# Patient Record
Sex: MALE | Race: WHITE | NOT HISPANIC OR LATINO | Employment: OTHER | URBAN - METROPOLITAN AREA
[De-identification: names, ages, dates, MRNs, and addresses within clinical notes are randomized per-mention and may not be internally consistent; named-entity substitution may affect disease eponyms.]

---

## 2020-09-02 ENCOUNTER — IMPORTED ENCOUNTER (OUTPATIENT)
Dept: URBAN - METROPOLITAN AREA CLINIC 9 | Facility: CLINIC | Age: 85
End: 2020-09-02

## 2021-09-08 ENCOUNTER — IMPORTED ENCOUNTER (OUTPATIENT)
Dept: URBAN - METROPOLITAN AREA CLINIC 9 | Facility: CLINIC | Age: 86
End: 2021-09-08

## 2021-10-16 ASSESSMENT — KERATOMETRY
OD_AXISANGLE_DEGREES: 108
OS_K1POWER_DIOPTERS: 44
OD_K1POWER_DIOPTERS: 43.75
OS_AXISANGLE_DEGREES: 158
OS_AXISANGLE_DEGREES: 165
OD_K2POWER_DIOPTERS: 44
OS_K1POWER_DIOPTERS: 43.5
OD_AXISANGLE2_DEGREES: 18
OS_K2POWER_DIOPTERS: 44.5
OD_K2POWER_DIOPTERS: 44.25
OD_AXISANGLE_DEGREES: 108
OS_AXISANGLE2_DEGREES: 75
OS_K2POWER_DIOPTERS: 43.75
OD_K1POWER_DIOPTERS: 43.5
OS_AXISANGLE2_DEGREES: 68
OD_AXISANGLE2_DEGREES: 18

## 2021-10-16 ASSESSMENT — VISUAL ACUITY
OS_CC: 20/40 -2 SN
OD_CC: 20/40 SN
OD_CC: 20/30 + SN
OS_CC: 20/50 SN
OS_CC: 20/40 - SN
OD_CC: 20/40 - SN
OS_CC: 20/40 + SN
OD_CC: 20/25 -2 SN
OS_CC: 20/40 + SN

## 2021-10-16 ASSESSMENT — TONOMETRY
OD_IOP_MMHG: 18
OD_IOP_MMHG: 18
OS_IOP_MMHG: 18
OS_IOP_MMHG: 17

## 2021-10-26 ENCOUNTER — PRE-OP/H&P (OUTPATIENT)
Dept: URBAN - METROPOLITAN AREA CLINIC 4 | Facility: CLINIC | Age: 86
End: 2021-10-26

## 2021-10-26 VITALS — HEART RATE: 82 BPM | DIASTOLIC BLOOD PRESSURE: 73 MMHG | HEIGHT: 60 IN | SYSTOLIC BLOOD PRESSURE: 143 MMHG

## 2021-10-26 DIAGNOSIS — H25.13: ICD-10-CM

## 2021-10-26 PROCEDURE — 92136 OPHTHALMIC BIOMETRY: CPT

## 2021-10-26 PROCEDURE — 99211PRE PRE OP VISIT

## 2021-11-03 ENCOUNTER — CATARACT POST-OP 1-DAY (OUTPATIENT)
Dept: URBAN - METROPOLITAN AREA CLINIC 4 | Facility: CLINIC | Age: 86
End: 2021-11-03

## 2021-11-03 DIAGNOSIS — Z96.1: ICD-10-CM

## 2021-11-03 PROCEDURE — 99024 POSTOP FOLLOW-UP VISIT: CPT

## 2021-11-03 ASSESSMENT — TONOMETRY: OS_IOP_MMHG: 23

## 2021-11-03 ASSESSMENT — VISUAL ACUITY
OU_SC: 20/60+2
OS_SC: 20/80-2

## 2021-11-18 ENCOUNTER — POST OP/EVAL OF SECOND EYE (OUTPATIENT)
Dept: URBAN - METROPOLITAN AREA CLINIC 4 | Facility: CLINIC | Age: 86
End: 2021-11-18

## 2021-11-18 DIAGNOSIS — Z96.1: ICD-10-CM

## 2021-11-18 DIAGNOSIS — H25.11: ICD-10-CM

## 2021-11-18 PROCEDURE — 99024 POSTOP FOLLOW-UP VISIT: CPT

## 2021-11-18 PROCEDURE — 92136 OPHTHALMIC BIOMETRY: CPT

## 2021-11-18 ASSESSMENT — TONOMETRY: OS_IOP_MMHG: 14

## 2021-11-18 ASSESSMENT — VISUAL ACUITY
OS_SC: 20/50+1
OU_SC: 20/50

## 2021-12-01 ENCOUNTER — CATARACT POST-OP 1-DAY (OUTPATIENT)
Dept: URBAN - METROPOLITAN AREA CLINIC 4 | Facility: CLINIC | Age: 86
End: 2021-12-01

## 2021-12-01 DIAGNOSIS — Z96.1: ICD-10-CM

## 2021-12-01 PROCEDURE — 99024 POSTOP FOLLOW-UP VISIT: CPT

## 2021-12-01 ASSESSMENT — VISUAL ACUITY
OU_SC: 20/30
OD_SC: 20/40+1

## 2021-12-01 ASSESSMENT — TONOMETRY: OD_IOP_MMHG: 19

## 2021-12-16 ENCOUNTER — POST-OP (OUTPATIENT)
Dept: URBAN - METROPOLITAN AREA CLINIC 4 | Facility: CLINIC | Age: 86
End: 2021-12-16

## 2021-12-16 DIAGNOSIS — Z96.1: ICD-10-CM

## 2021-12-16 PROCEDURE — 99024 POSTOP FOLLOW-UP VISIT: CPT

## 2021-12-16 RX ORDER — PREDNISOLONE ACETATE 10 MG/ML: 1 SUSPENSION/ DROPS OPHTHALMIC

## 2021-12-16 RX ORDER — KETOROLAC TROMETHAMINE 4 MG/ML: 1 SOLUTION/ DROPS OPHTHALMIC

## 2021-12-16 ASSESSMENT — KERATOMETRY
OS_K2POWER_DIOPTERS: 44.25
OD_AXISANGLE2_DEGREES: 146
OS_K1POWER_DIOPTERS: 43.25
OD_K1POWER_DIOPTERS: 44.00
OD_AXISANGLE_DEGREES: 56
OS_AXISANGLE2_DEGREES: 74
OS_AXISANGLE_DEGREES: 164
OD_K2POWER_DIOPTERS: 44.50

## 2021-12-16 ASSESSMENT — VISUAL ACUITY
OS_SC: 20/70-1
OD_SC: 20/40-1
OU_SC: 20/50

## 2021-12-16 ASSESSMENT — TONOMETRY
OS_IOP_MMHG: 12
OD_IOP_MMHG: 12

## 2021-12-23 ENCOUNTER — ESTABLISHED PATIENT (OUTPATIENT)
Dept: URBAN - METROPOLITAN AREA CLINIC 4 | Facility: CLINIC | Age: 86
End: 2021-12-23

## 2021-12-23 DIAGNOSIS — H35.352: ICD-10-CM

## 2021-12-23 PROCEDURE — 92014 COMPRE OPH EXAM EST PT 1/>: CPT

## 2021-12-23 PROCEDURE — 92134 CPTRZ OPH DX IMG PST SGM RTA: CPT

## 2021-12-23 ASSESSMENT — TONOMETRY
OS_IOP_MMHG: 14
OD_IOP_MMHG: 13

## 2021-12-23 ASSESSMENT — KERATOMETRY
OD_K1POWER_DIOPTERS: 44.00
OS_AXISANGLE2_DEGREES: 74
OS_K1POWER_DIOPTERS: 43.25
OD_AXISANGLE_DEGREES: 56
OD_K2POWER_DIOPTERS: 44.50
OS_AXISANGLE_DEGREES: 164
OS_K2POWER_DIOPTERS: 44.25
OD_AXISANGLE2_DEGREES: 146

## 2021-12-23 ASSESSMENT — VISUAL ACUITY
OD_SC: 20/50
OS_SC: 20/100

## 2022-01-27 ENCOUNTER — ESTABLISHED PATIENT (OUTPATIENT)
Dept: URBAN - METROPOLITAN AREA CLINIC 17 | Facility: CLINIC | Age: 87
End: 2022-01-27

## 2022-01-27 DIAGNOSIS — H35.352: ICD-10-CM

## 2022-01-27 PROCEDURE — 92012 INTRM OPH EXAM EST PATIENT: CPT

## 2022-01-27 PROCEDURE — 92134 CPTRZ OPH DX IMG PST SGM RTA: CPT

## 2022-01-27 ASSESSMENT — TONOMETRY
OS_IOP_MMHG: 15
OD_IOP_MMHG: 10

## 2022-01-27 ASSESSMENT — KERATOMETRY
OD_K2POWER_DIOPTERS: 44.50
OD_AXISANGLE2_DEGREES: 146
OD_K1POWER_DIOPTERS: 44.00
OS_K1POWER_DIOPTERS: 43.25
OS_AXISANGLE_DEGREES: 164
OS_K2POWER_DIOPTERS: 44.25
OS_AXISANGLE2_DEGREES: 74
OD_AXISANGLE_DEGREES: 56

## 2022-01-27 ASSESSMENT — VISUAL ACUITY
OD_SC: 20/25
OS_SC: 20/40

## 2022-03-10 ENCOUNTER — ESTABLISHED PATIENT (OUTPATIENT)
Dept: URBAN - METROPOLITAN AREA CLINIC 17 | Facility: CLINIC | Age: 87
End: 2022-03-10

## 2022-03-10 DIAGNOSIS — H35.352: ICD-10-CM

## 2022-03-10 PROCEDURE — 92012 INTRM OPH EXAM EST PATIENT: CPT

## 2022-03-10 PROCEDURE — 92134 CPTRZ OPH DX IMG PST SGM RTA: CPT

## 2022-03-10 ASSESSMENT — KERATOMETRY
OS_K1POWER_DIOPTERS: 43.25
OD_AXISANGLE_DEGREES: 56
OD_K2POWER_DIOPTERS: 44.50
OD_AXISANGLE2_DEGREES: 146
OS_AXISANGLE2_DEGREES: 74
OS_AXISANGLE_DEGREES: 164
OS_K2POWER_DIOPTERS: 44.25
OD_K1POWER_DIOPTERS: 44.00

## 2022-03-10 ASSESSMENT — TONOMETRY
OS_IOP_MMHG: 10
OD_IOP_MMHG: 10

## 2022-03-10 ASSESSMENT — VISUAL ACUITY
OS_SC: 20/40
OD_SC: 20/30-2
OU_SC: 20/25-1

## 2022-07-07 RX ORDER — SIMVASTATIN 40 MG
TABLET ORAL
COMMUNITY

## 2022-07-07 RX ORDER — BICALUTAMIDE 50 MG/1
TABLET, FILM COATED ORAL
COMMUNITY

## 2022-07-07 RX ORDER — FLUTICASONE FUROATE AND VILANTEROL 100; 25 UG/1; UG/1
POWDER RESPIRATORY (INHALATION)
COMMUNITY

## 2022-07-07 RX ORDER — SOLIFENACIN SUCCINATE 10 MG/1
TABLET, FILM COATED ORAL
COMMUNITY

## 2022-07-07 RX ORDER — GEMFIBROZIL 600 MG/1
TABLET, FILM COATED ORAL
COMMUNITY

## 2022-07-07 RX ORDER — CETIRIZINE HYDROCHLORIDE 10 MG/1
TABLET ORAL
COMMUNITY

## 2022-07-07 RX ORDER — FINASTERIDE 5 MG/1
TABLET, FILM COATED ORAL
COMMUNITY

## 2022-07-07 RX ORDER — GLIPIZIDE 2.5 MG/1
TABLET, EXTENDED RELEASE ORAL
COMMUNITY

## 2022-07-11 NOTE — PATIENT DISCUSSION
Ed. patient. assymetric ONH cupping OS>OD. Oct show superior thinning OD not seen on exam. Monitor x 1 year with repeat OCT.

## 2022-12-21 ENCOUNTER — ESTABLISHED PATIENT (OUTPATIENT)
Dept: URBAN - METROPOLITAN AREA CLINIC 4 | Facility: CLINIC | Age: 87
End: 2022-12-21

## 2022-12-21 PROCEDURE — 92015 DETERMINE REFRACTIVE STATE: CPT

## 2022-12-21 PROCEDURE — 92014 COMPRE OPH EXAM EST PT 1/>: CPT

## 2022-12-21 ASSESSMENT — VISUAL ACUITY
OS_GLARE: 20/40-1
OD_GLARE: 20/40
OU_SC: 20/25
OD_SC: 20/25-1
OS_SC: 20/25

## 2022-12-21 ASSESSMENT — KERATOMETRY
OD_K1POWER_DIOPTERS: 44
OS_K2POWER_DIOPTERS: 44
OD_K2POWER_DIOPTERS: 44.5
OD_AXISANGLE2_DEGREES: 131
OD_AXISANGLE_DEGREES: 41
OS_AXISANGLE2_DEGREES: 76
OS_AXISANGLE_DEGREES: 166
OS_K1POWER_DIOPTERS: 43

## 2022-12-21 ASSESSMENT — TONOMETRY
OS_IOP_MMHG: 10
OD_IOP_MMHG: 10

## 2024-03-28 ENCOUNTER — ESTABLISHED PATIENT (OUTPATIENT)
Facility: LOCATION | Age: 89
End: 2024-03-28

## 2024-03-28 DIAGNOSIS — H17.813: ICD-10-CM

## 2024-03-28 DIAGNOSIS — E11.9: ICD-10-CM

## 2024-03-28 PROCEDURE — 92015 DETERMINE REFRACTIVE STATE: CPT

## 2024-03-28 PROCEDURE — 92014 COMPRE OPH EXAM EST PT 1/>: CPT

## 2024-03-28 ASSESSMENT — KERATOMETRY
OD_AXISANGLE_DEGREES: 41
OD_K1POWER_DIOPTERS: 44
OS_K2POWER_DIOPTERS: 44
OS_K1POWER_DIOPTERS: 43
OS_AXISANGLE_DEGREES: 166
OD_AXISANGLE2_DEGREES: 131
OS_AXISANGLE2_DEGREES: 76
OD_K2POWER_DIOPTERS: 44.5

## 2024-03-28 ASSESSMENT — TONOMETRY
OS_IOP_MMHG: 11
OD_IOP_MMHG: 8

## 2024-03-28 ASSESSMENT — VISUAL ACUITY
OS_CC: 20/40
OU_CC: 20/40
OD_CC: 20/70